# Patient Record
Sex: MALE | Race: WHITE | NOT HISPANIC OR LATINO | Employment: FULL TIME | URBAN - METROPOLITAN AREA
[De-identification: names, ages, dates, MRNs, and addresses within clinical notes are randomized per-mention and may not be internally consistent; named-entity substitution may affect disease eponyms.]

---

## 2018-12-19 ENCOUNTER — OFFICE VISIT CONVERTED (OUTPATIENT)
Dept: FAMILY MEDICINE CLINIC | Facility: CLINIC | Age: 43
End: 2018-12-19
Attending: NURSE PRACTITIONER

## 2018-12-19 ENCOUNTER — CONVERSION ENCOUNTER (OUTPATIENT)
Dept: FAMILY MEDICINE CLINIC | Facility: CLINIC | Age: 43
End: 2018-12-19

## 2021-05-16 VITALS
HEART RATE: 120 BPM | WEIGHT: 257.5 LBS | DIASTOLIC BLOOD PRESSURE: 81 MMHG | OXYGEN SATURATION: 96 % | SYSTOLIC BLOOD PRESSURE: 131 MMHG | BODY MASS INDEX: 36.05 KG/M2 | TEMPERATURE: 98.6 F | HEIGHT: 71 IN

## 2023-03-03 ENCOUNTER — TELEPHONE (OUTPATIENT)
Dept: ORTHOPEDIC SURGERY | Facility: CLINIC | Age: 48
End: 2023-03-03
Payer: COMMERCIAL

## 2023-03-03 NOTE — TELEPHONE ENCOUNTER
THE [facility]UB CALLED TO GET PT SCHEDULE APPT FOR FRACTURE RT FOOT. XRAYS DONE AT [facility]RGENT [facility]. PLEASE ADVISE. PT CAN BE REACHED [phone]

## 2023-03-09 ENCOUNTER — OFFICE VISIT (OUTPATIENT)
Dept: ORTHOPEDIC SURGERY | Facility: CLINIC | Age: 48
End: 2023-03-09
Payer: COMMERCIAL

## 2023-03-09 VITALS — BODY MASS INDEX: 35.5 KG/M2 | HEIGHT: 70 IN | OXYGEN SATURATION: 98 % | WEIGHT: 248 LBS | HEART RATE: 80 BPM

## 2023-03-09 DIAGNOSIS — S92.214A CLOSED NONDISPLACED FRACTURE OF CUBOID OF RIGHT FOOT, INITIAL ENCOUNTER: Primary | ICD-10-CM

## 2023-03-09 PROCEDURE — 99203 OFFICE O/P NEW LOW 30 MIN: CPT | Performed by: ORTHOPAEDIC SURGERY

## 2023-03-09 NOTE — PROGRESS NOTES
"Chief Complaint  Initial Evaluation of the Right Foot     Subjective      Kurtis Tovar presents to [facility] for evaluation of the right foot. The patient reports he was playing basketball and injured his right foot on 3/3/23. He was seen and evaluated with x-rays and was placed into a CAM walker boot. He is ambulating with crutches. He has no other complaints.     No Known Allergies     Social History     Socioeconomic History   • Marital status:    Tobacco Use   • Smoking status: Never   • Smokeless tobacco: Current   Vaping Use   • Vaping Use: Never used   Substance and Sexual Activity   • Alcohol use: Not Currently   • Drug use: Never   • Sexual activity: Defer        Review of Systems     Objective   Vital Signs:   Pulse 80   Ht 177.8 cm (70\")   Wt 112 kg (248 lb)   SpO2 98%   BMI 35.58 kg/m²       Physical Exam  Constitutional:       Appearance: Normal appearance. The patient is well-developed and normal weight.   HENT:      Head: Normocephalic.      Right Ear: Hearing and external ear normal.      Left Ear: Hearing and external ear normal.      Nose: Nose normal.   Eyes:      Conjunctiva/sclera: Conjunctivae normal.   Cardiovascular:      Rate and Rhythm: Normal rate.   Pulmonary:      Effort: Pulmonary effort is normal.      Breath sounds: No wheezing or rales.   Abdominal:      Palpations: Abdomen is soft.      Tenderness: There is no abdominal tenderness.   Musculoskeletal:      Cervical back: Normal range of motion.   Skin:     Findings: No rash.   Neurological:      Mental Status: The patient is alert and oriented to person, place, and time.   Psychiatric:         Mood and Affect: Mood and affect normal.         Judgment: Judgment normal.       Ortho Exam      Right foot- Positive EHL, FHL, GS and TA. Sensation intact to all 5 nerves of the foot. Positive pulses. Tender to the lateral hind foot. Neurovascularly intact. Good capillary refill. Intact ankle and " toe motion. Resolving bruising to posterior lateral heel. Mild swelling. Plantar flexion 45.     Procedures      Imaging Results (Most Recent)     None           Result Review :       XR Foot 3+ View Right    Result Date: 3/3/2023  Narrative: PROCEDURE: XR FOOT 3+ VW RIGHT  COMPARISON: None  INDICATIONS: right 5th MT tenderness, discolored, edema  FINDINGS:  In the oblique view, there is a lucent line extending obliquely through the distal and lateral aspect of the cuboid bone.  No other suspicious findings are demonstrated.  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  Correlate with focal exam findings      ADRIEL RODRIGUEZ MD       Electronically Signed and Approved By: ADRIEL RODRIGUEZ MD on 3/03/2023 at 11:14                      Assessment and Plan     Diagnoses and all orders for this visit:    1. Closed nondisplaced fracture of cuboid of right foot, initial encounter (Primary)        Discussed the treatment plan with the patient.  I reviewed the x-rays that were obtained previously with the patient. Plan for conservative treatment. Plan to continue CAM walker boot and crutches. The patient expressed understanding and wished to proceed.     Educated on risk of smoking. Discussed options for smoking cessation. and Call or return if worsening symptoms.    Follow Up     2 weeks with repeat 3 view foot x-rays      Patient was given instructions and counseling regarding his condition or for health maintenance advice. Please see specific information pulled into the AVS if appropriate.     Scribed for Hill Winn MD by Parul Mai.  03/09/23   15:37 EST    I have personally performed the services described in this document as scribed by the above individual and it is both accurate and complete. Hill Winn MD 03/09/23

## 2023-03-23 ENCOUNTER — OFFICE VISIT (OUTPATIENT)
Dept: ORTHOPEDIC SURGERY | Facility: CLINIC | Age: 48
End: 2023-03-23
Payer: COMMERCIAL

## 2023-03-23 VITALS — HEART RATE: 100 BPM | BODY MASS INDEX: 35.5 KG/M2 | WEIGHT: 248 LBS | HEIGHT: 70 IN | OXYGEN SATURATION: 92 %

## 2023-03-23 DIAGNOSIS — S92.214D CLOSED NONDISPLACED FRACTURE OF CUBOID OF RIGHT FOOT WITH ROUTINE HEALING, SUBSEQUENT ENCOUNTER: Primary | ICD-10-CM

## 2023-03-23 DIAGNOSIS — M79.671 RIGHT FOOT PAIN: ICD-10-CM

## 2023-03-23 PROBLEM — S92.214A CLOSED NONDISPLACED FRACTURE OF CUBOID BONE OF RIGHT FOOT: Status: ACTIVE | Noted: 2023-03-23

## 2023-03-23 PROCEDURE — 99213 OFFICE O/P EST LOW 20 MIN: CPT | Performed by: PHYSICIAN ASSISTANT

## 2023-03-23 NOTE — PROGRESS NOTES
"Chief Complaint  Pain and Follow-up of the Right Foot    Subjective          History of Present Illness      Kurtis Tovar is a 47 y.o. male  presents to [facility] for     Patient presents with his wife Sue for follow-up evaluation of right cuboid fracture, original injury was 3/3/2023 when he was playing basketball.  He has been ambulating on crutches, nonweightbearing and using his walking boot.  He states that his pain has improved he states he has been working on gentle range of motion on his own he denies new injury or symptoms of pain, states he has been working on range of motion on his own.  Denies need for pain medication denies numbness and tingling but states the foot still feels swollen.  He continues to work he works at the [employer]      No Known Allergies     Social History     Socioeconomic History   • Marital status:    Tobacco Use   • Smoking status: Never   • Smokeless tobacco: Current   Vaping Use   • Vaping Use: Never used   Substance and Sexual Activity   • Alcohol use: Not Currently   • Drug use: Never   • Sexual activity: Defer        REVIEW OF SYSTEMS    Constitutional: Denies fevers, chills, weight loss  Cardiovascular: Denies chest pain, shortness of breath  Skin: Denies rashes, acute skin changes  Neurologic: Denies headache, loss of consciousness  MSK: Right foot pain      Objective   Vital Signs:   Pulse 100   Ht 177.8 cm (70\")   Wt 112 kg (248 lb)   SpO2 92%   BMI 35.58 kg/m²     Body mass index is 35.58 kg/m².    Physical Exam    Right foot: Skin is intact, no erythema, no ecchymosis, no swelling, 2+ dorsalis pedis/posterior tibialis pulses, capillary fill less than 3 seconds, patient is able to wiggle toes, ankle range of motion intact/appropriate, mild tenderness to palpation at fracture site,    Procedures    Imaging Results (Most Recent)     Procedure Component Value Units Date/Time    XR Foot 3+ View Right [[id number]] Resulted: " 03/23/23 1356     Updated: 03/23/23 1356    Narrative:      • View:Oblique and AP/Lateral view(s)  • Site: Right foot  • Indication: Right foot pain  • Study: X-rays ordered, taken in the office, and reviewed today  • X-ray: Healing nondisplaced cuboid fracture most visible on oblique   view, fracture alignment remains stable compared to previous study  • Comparative data: Compared to previous study             Result Review :   The following data was reviewed by: CATERINA Bethea on 03/23/2023:  Data reviewed: Radiologic studies Reviewed by me with the patient and his wife             Assessment and Plan    Diagnoses and all orders for this visit:    1. Closed nondisplaced fracture of cuboid of right foot with routine healing, subsequent encounter (Primary)    2. Right foot pain  -     XR Foot 3+ View Right        Reviewed x-rays with the patient his wife, discussed continued boot use, he will start 50% weightbearing with crutches assistance, advance to 75% weightbearing with crutches assistance/1 crutch and then 3 to 4 weeks from now may start ambulating weightbearing as tolerated in the boot, patient agreed, if any new or concerning symptoms occur follow-up right away otherwise follow-up in 4 weeks with x-rays.    Call or return if worsening symptoms.    Follow Up   Return in about 4 weeks (around 4/20/2023) for Recheck.  Patient was given instructions and counseling regarding his condition or for health maintenance advice. Please see specific information pulled into the AVS if appropriate.

## 2023-04-27 ENCOUNTER — OFFICE VISIT (OUTPATIENT)
Dept: ORTHOPEDIC SURGERY | Facility: CLINIC | Age: 48
End: 2023-04-27
Payer: COMMERCIAL

## 2023-04-27 VITALS — HEIGHT: 70 IN | BODY MASS INDEX: 35.35 KG/M2 | HEART RATE: 94 BPM | WEIGHT: 246.91 LBS | OXYGEN SATURATION: 95 %

## 2023-04-27 DIAGNOSIS — S92.214D CLOSED NONDISPLACED FRACTURE OF CUBOID OF RIGHT FOOT WITH ROUTINE HEALING, SUBSEQUENT ENCOUNTER: Primary | ICD-10-CM

## 2023-04-27 DIAGNOSIS — M79.671 RIGHT FOOT PAIN: ICD-10-CM

## 2023-04-27 NOTE — PROGRESS NOTES
"Chief Complaint  Pain and Follow-up of the Right Foot    Subjective          History of Present Illness      Kurtis Tovar is a 47 y.o. male  presents to [facility] for     Patient presents for follow-up evaluation right cuboid fracture original injury was 3/3/2023.  He presents in regular shoes he states he stopped the boot over the last 2 weeks, he went to [place] was able to walk around on the beach and at certain venues without pain.  He presents in regular walking shoes, he states he has not been playing any sports but states with regular walking he has no pain.  No new complaints      No Known Allergies     Social History     Socioeconomic History   • Marital status:    Tobacco Use   • Smoking status: Never   • Smokeless tobacco: Current   Vaping Use   • Vaping Use: Never used   Substance and Sexual Activity   • Alcohol use: Not Currently   • Drug use: Never   • Sexual activity: Defer        REVIEW OF SYSTEMS    Constitutional: Denies fevers, chills, weight loss  Cardiovascular: Denies chest pain, shortness of breath  Skin: Denies rashes, acute skin changes  Neurologic: Denies headache, loss of consciousness  MSK: Right foot pain      Objective   Vital Signs:   Pulse 94   Ht 177.8 cm (70\")   Wt 112 kg (246 lb 14.6 oz)   SpO2 95%   BMI 35.43 kg/m²     Body mass index is 35.43 kg/m².    Physical Exam    Right foot: Nontender to palpation at fracture site, full range of motion, strength, patient ambulates with nonantalgic gait, neurovascular intact    Procedures    Imaging Results (Most Recent)     Procedure Component Value Units Date/Time    XR Foot 3+ View Right [[id number]] Resulted: 04/27/23 1158     Updated: 04/27/23 1158    Narrative:      • View:AP/Lateral view(s)  • Site: Right foot  • Indication: Right foot pain  • Study: X-rays ordered, taken in the office, and reviewed today  • X-ray: Good healing of cuboid fracture, fracture alignment remains   stable  • " Comparative data: No comparative data found             Result Review :   The following data was reviewed by: CATERINA Bethea on 04/27/2023:  Data reviewed: Radiologic studies Reviewed by me with the patient             Assessment and Plan    Diagnoses and all orders for this visit:    1. Closed nondisplaced fracture of cuboid of right foot with routine healing, subsequent encounter (Primary)    2. Right foot pain  -     XR Foot 3+ View Right        Advised patient to avoid strenuous activities for the next month, continue to weight-bear as tolerated follow-up as needed    Call or return if worsening symptoms.    Follow Up   Return if symptoms worsen or fail to improve.  Patient was given instructions and counseling regarding his condition or for health maintenance advice. Please see specific information pulled into the AVS if appropriate.